# Patient Record
(demographics unavailable — no encounter records)

---

## 2017-03-20 NOTE — CR
EXAMINATION: Bilateral feet

 

HISTORY: Pain

 

COMPARISON: None

 

TECHNIQUE: 2 views bilaterally

 

FINDINGS: There is mild to moderate hallux valgus bilaterally with hypertrophic osteoarthritic fong
es noted at the first MTP joints. Bone mineralization is normal. No fracture or acute osseous abnorm
ality. There is moderate right and mild left pes planus. Mild to moderate degenerative changes noted
 within the midfoot bilaterally.

 

IMPRESSION: 

1. Bilateral hallux valgus with osteoarthritic changes at the first MTP joints.

2. Bilateral pes planus.